# Patient Record
Sex: MALE | Race: WHITE | Employment: OTHER | URBAN - METROPOLITAN AREA
[De-identification: names, ages, dates, MRNs, and addresses within clinical notes are randomized per-mention and may not be internally consistent; named-entity substitution may affect disease eponyms.]

---

## 2017-03-22 RX ORDER — METHYLPREDNISOLONE 4 MG/1
TABLET ORAL
Qty: 1 KIT | Refills: 0 | Status: SHIPPED | OUTPATIENT
Start: 2017-03-22 | End: 2017-03-28

## 2017-04-10 ENCOUNTER — OFFICE VISIT (OUTPATIENT)
Dept: FAMILY MEDICINE CLINIC | Age: 27
End: 2017-04-10

## 2017-04-10 VITALS
BODY MASS INDEX: 24.04 KG/M2 | TEMPERATURE: 98.1 F | HEIGHT: 68 IN | SYSTOLIC BLOOD PRESSURE: 132 MMHG | HEART RATE: 76 BPM | WEIGHT: 158.6 LBS | DIASTOLIC BLOOD PRESSURE: 84 MMHG | RESPIRATION RATE: 16 BRPM

## 2017-04-10 DIAGNOSIS — F51.01 PRIMARY INSOMNIA: Primary | ICD-10-CM

## 2017-04-10 DIAGNOSIS — L30.9 ECZEMA, UNSPECIFIED TYPE: ICD-10-CM

## 2017-04-10 PROCEDURE — 99213 OFFICE O/P EST LOW 20 MIN: CPT | Performed by: FAMILY MEDICINE

## 2017-04-10 RX ORDER — PREDNISONE 10 MG/1
TABLET ORAL
Qty: 30 TABLET | Refills: 0 | Status: SHIPPED | OUTPATIENT
Start: 2017-04-10 | End: 2017-10-30 | Stop reason: SDUPTHER

## 2017-04-10 RX ORDER — ZOLPIDEM TARTRATE 10 MG/1
TABLET ORAL
Qty: 30 TABLET | Refills: 5 | Status: SHIPPED | OUTPATIENT
Start: 2017-04-10 | End: 2017-10-23 | Stop reason: SDUPTHER

## 2017-04-10 ASSESSMENT — ENCOUNTER SYMPTOMS
COUGH: 0
SHORTNESS OF BREATH: 0
DIARRHEA: 0
CONSTIPATION: 0
EYES NEGATIVE: 1
ABDOMINAL PAIN: 0
NAUSEA: 0

## 2017-09-12 DIAGNOSIS — L30.9 ECZEMA, UNSPECIFIED TYPE: ICD-10-CM

## 2017-10-23 DIAGNOSIS — F51.01 PRIMARY INSOMNIA: ICD-10-CM

## 2017-10-23 RX ORDER — ZOLPIDEM TARTRATE 10 MG/1
TABLET ORAL
Qty: 30 TABLET | Refills: 0 | OUTPATIENT
Start: 2017-10-23 | End: 2017-10-30 | Stop reason: SDUPTHER

## 2017-10-23 NOTE — TELEPHONE ENCOUNTER
Last seen 4-2017. Dr. Chandu Ambriz patient. Has an upcomming appointment, but will be out of medication.

## 2017-10-23 NOTE — TELEPHONE ENCOUNTER
From: Fabian Recio  Sent: 10/23/2017 10:49 AM EDT  Subject: Medication Renewal Request    Fabian Recio would like a refill of the following medications:  zolpidem (AMBIEN) 10 MG tablet Elke Araiza MD]    Preferred pharmacy: [facility] [address] [place] [address] [phone] F [phone]    Comment:  Out of prescription on 10/25, seeing doctor for appt on 10/30. Hoping to get this filled before the doctor visit.

## 2017-10-30 ENCOUNTER — OFFICE VISIT (OUTPATIENT)
Dept: FAMILY MEDICINE CLINIC | Age: 27
End: 2017-10-30

## 2017-10-30 VITALS
SYSTOLIC BLOOD PRESSURE: 112 MMHG | BODY MASS INDEX: 25.04 KG/M2 | WEIGHT: 165.2 LBS | TEMPERATURE: 97.3 F | RESPIRATION RATE: 12 BRPM | HEART RATE: 52 BPM | DIASTOLIC BLOOD PRESSURE: 78 MMHG | HEIGHT: 68 IN

## 2017-10-30 DIAGNOSIS — F51.01 PRIMARY INSOMNIA: ICD-10-CM

## 2017-10-30 DIAGNOSIS — L30.8 OTHER ECZEMA: Primary | ICD-10-CM

## 2017-10-30 DIAGNOSIS — L30.9 ECZEMA, UNSPECIFIED TYPE: ICD-10-CM

## 2017-10-30 PROCEDURE — 99213 OFFICE O/P EST LOW 20 MIN: CPT | Performed by: FAMILY MEDICINE

## 2017-10-30 PROCEDURE — G8484 FLU IMMUNIZE NO ADMIN: HCPCS | Performed by: FAMILY MEDICINE

## 2017-10-30 PROCEDURE — G8427 DOCREV CUR MEDS BY ELIG CLIN: HCPCS | Performed by: FAMILY MEDICINE

## 2017-10-30 PROCEDURE — 1036F TOBACCO NON-USER: CPT | Performed by: FAMILY MEDICINE

## 2017-10-30 PROCEDURE — 96372 THER/PROPH/DIAG INJ SC/IM: CPT | Performed by: FAMILY MEDICINE

## 2017-10-30 PROCEDURE — G8419 CALC BMI OUT NRM PARAM NOF/U: HCPCS | Performed by: FAMILY MEDICINE

## 2017-10-30 RX ORDER — PREDNISONE 10 MG/1
TABLET ORAL
Qty: 30 TABLET | Refills: 0 | Status: SHIPPED | OUTPATIENT
Start: 2017-10-30 | End: 2017-11-09

## 2017-10-30 RX ORDER — ZOLPIDEM TARTRATE 10 MG/1
TABLET ORAL
Qty: 30 TABLET | Refills: 5 | Status: SHIPPED | OUTPATIENT
Start: 2017-10-30 | End: 2018-05-14 | Stop reason: SDUPTHER

## 2017-10-30 RX ORDER — TRIAMCINOLONE ACETONIDE 40 MG/ML
80 INJECTION, SUSPENSION INTRA-ARTICULAR; INTRAMUSCULAR ONCE
Status: COMPLETED | OUTPATIENT
Start: 2017-10-30 | End: 2017-10-30

## 2017-10-30 RX ADMIN — TRIAMCINOLONE ACETONIDE 80 MG: 40 INJECTION, SUSPENSION INTRA-ARTICULAR; INTRAMUSCULAR at 19:07

## 2017-10-30 ASSESSMENT — ENCOUNTER SYMPTOMS
SHORTNESS OF BREATH: 0
EYES NEGATIVE: 1
NAUSEA: 0
ABDOMINAL PAIN: 0
COUGH: 0
CONSTIPATION: 0
DIARRHEA: 0

## 2017-10-30 NOTE — PROGRESS NOTES
Not on file     Other Topics Concern    Not on file     Social History Narrative    No narrative on file     No family history on file. Allergies   Allergen Reactions    Amoxil [Amoxicillin Trihydrate] Hives    Ketek [Telithromycin] Hives    Pcn [Penicillins] Hives     Current Outpatient Prescriptions   Medication Sig Dispense Refill    zolpidem (AMBIEN) 10 MG tablet TAKE ONE TABLET BY MOUTH IN THE EVENING AS NEEDED 30 tablet 5    fluocinonide (LIDEX) 0.05 % cream Apply topically 2 times daily. 30 g 1    predniSONE (DELTASONE) 10 MG tablet TID FOR 5 DAYS THEN BID FOR 5 DAYS THEN 1 DAILY 30 tablet 0    hydrocortisone 2.5 % cream Apply topically 2 times daily. 360 g 2     No current facility-administered medications for this visit. PMH, Surgical Hx, Family Hx, and Social Hx reviewed and updated. Health Maintenance reviewed. Objective    Vitals:    10/30/17 1816   BP: 112/78   Pulse: 52   Resp: 12   Temp: 97.3 °F (36.3 °C)   TempSrc: Temporal   Weight: 165 lb 3.2 oz (74.9 kg)   Height: 5' 8\" (1.727 m)       Physical Exam   Constitutional: He appears well-nourished. HENT:   Nose: Nose normal.   Mouth/Throat: Oropharynx is clear and moist.   Eyes: Conjunctivae are normal. Pupils are equal, round, and reactive to light. Neck: Neck supple. No thyromegaly present. Cardiovascular: Normal rate, regular rhythm and normal heart sounds. No murmur heard. Pulmonary/Chest: Breath sounds normal. He has no wheezes. Abdominal: Soft. He exhibits no mass. There is no tenderness. Lymphadenopathy:     He has no cervical adenopathy. Skin: Rash noted. MANY DRY ECZEMA PATCHES MOSTLY TRUNK       Assessment & Plan   1. Other eczema  fluocinonide (LIDEX) 0.05 % cream    triamcinolone acetonide (KENALOG-40) injection 80 mg   2. Primary insomnia  zolpidem (AMBIEN) 10 MG tablet   3.  Eczema, unspecified type  predniSONE (DELTASONE) 10 MG tablet     No orders of the defined types were placed in this

## 2018-04-11 ENCOUNTER — TELEPHONE (OUTPATIENT)
Dept: FAMILY MEDICINE CLINIC | Age: 28
End: 2018-04-11

## 2018-04-11 DIAGNOSIS — R94.31 PROLONGED QT INTERVAL: Primary | ICD-10-CM

## 2018-05-14 DIAGNOSIS — F51.01 PRIMARY INSOMNIA: ICD-10-CM

## 2018-05-14 RX ORDER — ZOLPIDEM TARTRATE 10 MG/1
TABLET ORAL
Qty: 30 TABLET | Refills: 2 | OUTPATIENT
Start: 2018-05-14 | End: 2018-05-19 | Stop reason: SDUPTHER

## 2018-05-19 ENCOUNTER — OFFICE VISIT (OUTPATIENT)
Dept: FAMILY MEDICINE CLINIC | Age: 28
End: 2018-05-19

## 2018-05-19 VITALS
SYSTOLIC BLOOD PRESSURE: 116 MMHG | HEART RATE: 80 BPM | DIASTOLIC BLOOD PRESSURE: 70 MMHG | WEIGHT: 152 LBS | RESPIRATION RATE: 20 BRPM | HEIGHT: 68 IN | BODY MASS INDEX: 23.04 KG/M2 | TEMPERATURE: 97.6 F

## 2018-05-19 DIAGNOSIS — F51.01 PRIMARY INSOMNIA: ICD-10-CM

## 2018-05-19 PROCEDURE — 99212 OFFICE O/P EST SF 10 MIN: CPT | Performed by: FAMILY MEDICINE

## 2018-05-19 RX ORDER — ZOLPIDEM TARTRATE 10 MG/1
TABLET ORAL
Qty: 30 TABLET | Refills: 5 | Status: SHIPPED | OUTPATIENT
Start: 2018-05-19 | End: 2018-07-19

## 2018-05-19 ASSESSMENT — ENCOUNTER SYMPTOMS
SINUS PRESSURE: 0
ABDOMINAL PAIN: 0
CHEST TIGHTNESS: 0
EYE ITCHING: 0
EYE DISCHARGE: 0
CONSTIPATION: 0
SHORTNESS OF BREATH: 0
SORE THROAT: 0
DIARRHEA: 0

## 2018-07-30 ENCOUNTER — OFFICE VISIT (OUTPATIENT)
Dept: FAMILY MEDICINE CLINIC | Age: 28
End: 2018-07-30

## 2018-07-30 VITALS
WEIGHT: 147.2 LBS | SYSTOLIC BLOOD PRESSURE: 106 MMHG | DIASTOLIC BLOOD PRESSURE: 80 MMHG | TEMPERATURE: 96.4 F | HEART RATE: 66 BPM | RESPIRATION RATE: 12 BRPM | HEIGHT: 68 IN | BODY MASS INDEX: 22.31 KG/M2

## 2018-07-30 DIAGNOSIS — F51.01 PRIMARY INSOMNIA: Primary | ICD-10-CM

## 2018-07-30 PROCEDURE — 99212 OFFICE O/P EST SF 10 MIN: CPT | Performed by: FAMILY MEDICINE

## 2018-07-30 RX ORDER — ZOLPIDEM TARTRATE 5 MG/1
5 TABLET ORAL NIGHTLY PRN
Qty: 30 TABLET | Refills: 2 | Status: SHIPPED | OUTPATIENT
Start: 2018-07-30 | End: 2018-08-29

## 2018-07-30 ASSESSMENT — ENCOUNTER SYMPTOMS
NAUSEA: 0
DIARRHEA: 0
EYES NEGATIVE: 1
SHORTNESS OF BREATH: 0
CONSTIPATION: 0
ABDOMINAL PAIN: 0
COUGH: 0

## 2018-07-30 ASSESSMENT — PATIENT HEALTH QUESTIONNAIRE - PHQ9
SUM OF ALL RESPONSES TO PHQ QUESTIONS 1-9: 2
2. FEELING DOWN, DEPRESSED OR HOPELESS: 1
1. LITTLE INTEREST OR PLEASURE IN DOING THINGS: 1
SUM OF ALL RESPONSES TO PHQ9 QUESTIONS 1 & 2: 2

## 2018-07-30 NOTE — PROGRESS NOTES
medications. No Follow-up on file. Long talk. Health maintenance issues reviewed and discussed with recommendations made. The importance of a good diet and exercise regimen discussed. Take medications as prescribed. Cut back on Ambien to 5 mg and then continue to wean off  Sleep habits reviewed  Consider trial of belsomra in future if does not respond  Follow up as instructed. Call if any problems           Stew De La Torre MD          Please note this report has been partially produced using speech recognition software  And may cause contain errors related to that system including grammar, punctuation and spelling as well as words and phrases that may seem inappropriate. If there are questions or concerns please feel free to contact me to clarify.

## 2019-01-21 ENCOUNTER — OFFICE VISIT (OUTPATIENT)
Dept: FAMILY MEDICINE CLINIC | Age: 29
End: 2019-01-21

## 2019-01-21 VITALS
TEMPERATURE: 97.9 F | SYSTOLIC BLOOD PRESSURE: 110 MMHG | BODY MASS INDEX: 22.38 KG/M2 | HEART RATE: 64 BPM | DIASTOLIC BLOOD PRESSURE: 60 MMHG | WEIGHT: 147.2 LBS

## 2019-01-21 DIAGNOSIS — F90.0 ATTENTION DEFICIT HYPERACTIVITY DISORDER (ADHD), PREDOMINANTLY INATTENTIVE TYPE: ICD-10-CM

## 2019-01-21 DIAGNOSIS — F51.01 PRIMARY INSOMNIA: Primary | ICD-10-CM

## 2019-01-21 PROCEDURE — 99212 OFFICE O/P EST SF 10 MIN: CPT | Performed by: FAMILY MEDICINE

## 2019-01-21 RX ORDER — ZOLPIDEM TARTRATE 5 MG/1
5 TABLET ORAL NIGHTLY PRN
Qty: 30 TABLET | Refills: 4 | Status: SHIPPED | OUTPATIENT
Start: 2019-01-21 | End: 2019-02-04

## 2019-01-21 RX ORDER — PHENOL 1.4 %
1 AEROSOL, SPRAY (ML) MUCOUS MEMBRANE NIGHTLY
COMMUNITY

## 2019-01-21 RX ORDER — DEXTROAMPHETAMINE SACCHARATE, AMPHETAMINE ASPARTATE, DEXTROAMPHETAMINE SULFATE AND AMPHETAMINE SULFATE 3.75; 3.75; 3.75; 3.75 MG/1; MG/1; MG/1; MG/1
15 TABLET ORAL 2 TIMES DAILY
Qty: 60 TABLET | Refills: 0 | Status: SHIPPED | OUTPATIENT
Start: 2019-01-21 | End: 2019-02-20

## 2019-01-21 ASSESSMENT — ENCOUNTER SYMPTOMS
EYES NEGATIVE: 1
COUGH: 0
CONSTIPATION: 0
ABDOMINAL PAIN: 0
NAUSEA: 0
DIARRHEA: 0
SHORTNESS OF BREATH: 0

## 2019-03-22 ENCOUNTER — TELEPHONE (OUTPATIENT)
Dept: FAMILY MEDICINE CLINIC | Age: 29
End: 2019-03-22

## 2023-02-28 PROBLEM — I45.81 PROLONGED QT SYNDROME: Status: ACTIVE | Noted: 2023-02-28

## 2023-02-28 PROBLEM — F51.01 PRIMARY INSOMNIA: Status: ACTIVE | Noted: 2023-02-28

## 2023-02-28 PROBLEM — F98.8 ATTENTION DEFICIT DISORDER: Status: ACTIVE | Noted: 2023-02-28

## 2023-02-28 PROBLEM — L85.3 DRY SKIN: Status: ACTIVE | Noted: 2023-02-28

## 2023-02-28 PROBLEM — R53.83 MALAISE AND FATIGUE: Status: ACTIVE | Noted: 2023-02-28

## 2023-02-28 PROBLEM — R53.81 MALAISE AND FATIGUE: Status: ACTIVE | Noted: 2023-02-28

## 2023-02-28 RX ORDER — DEXTROAMPHETAMINE SACCHARATE, AMPHETAMINE ASPARTATE, DEXTROAMPHETAMINE SULFATE AND AMPHETAMINE SULFATE 2.5; 2.5; 2.5; 2.5 MG/1; MG/1; MG/1; MG/1
1 TABLET ORAL 3 TIMES DAILY
COMMUNITY
Start: 2019-01-21 | End: 2023-03-13 | Stop reason: SDUPTHER

## 2023-02-28 RX ORDER — TRIAMCINOLONE ACETONIDE 1 MG/G
CREAM TOPICAL SEE ADMIN INSTRUCTIONS
COMMUNITY

## 2023-02-28 RX ORDER — ZOLPIDEM TARTRATE 5 MG/1
1 TABLET ORAL NIGHTLY PRN
COMMUNITY
Start: 2019-04-15 | End: 2023-03-13 | Stop reason: SDUPTHER

## 2023-03-13 ENCOUNTER — OFFICE VISIT (OUTPATIENT)
Dept: PRIMARY CARE | Facility: CLINIC | Age: 33
End: 2023-03-13
Payer: COMMERCIAL

## 2023-03-13 VITALS
TEMPERATURE: 98.6 F | HEART RATE: 68 BPM | DIASTOLIC BLOOD PRESSURE: 76 MMHG | SYSTOLIC BLOOD PRESSURE: 110 MMHG | BODY MASS INDEX: 22.69 KG/M2 | WEIGHT: 153.2 LBS | RESPIRATION RATE: 16 BRPM | HEIGHT: 69 IN

## 2023-03-13 DIAGNOSIS — I45.81 PROLONGED QT SYNDROME: ICD-10-CM

## 2023-03-13 DIAGNOSIS — Z00.00 ANNUAL PHYSICAL EXAM: Primary | ICD-10-CM

## 2023-03-13 DIAGNOSIS — F51.01 PRIMARY INSOMNIA: ICD-10-CM

## 2023-03-13 DIAGNOSIS — F98.8 ATTENTION DEFICIT DISORDER, UNSPECIFIED HYPERACTIVITY PRESENCE: ICD-10-CM

## 2023-03-13 PROCEDURE — 1036F TOBACCO NON-USER: CPT | Performed by: FAMILY MEDICINE

## 2023-03-13 PROCEDURE — 99395 PREV VISIT EST AGE 18-39: CPT | Performed by: FAMILY MEDICINE

## 2023-03-13 RX ORDER — ZOLPIDEM TARTRATE 5 MG/1
5 TABLET ORAL NIGHTLY PRN
Qty: 30 TABLET | Refills: 5 | Status: SHIPPED | OUTPATIENT
Start: 2023-03-13 | End: 2023-03-14 | Stop reason: SDUPTHER

## 2023-03-13 RX ORDER — DEXTROAMPHETAMINE SACCHARATE, AMPHETAMINE ASPARTATE, DEXTROAMPHETAMINE SULFATE AND AMPHETAMINE SULFATE 2.5; 2.5; 2.5; 2.5 MG/1; MG/1; MG/1; MG/1
1 TABLET ORAL 3 TIMES DAILY
Qty: 90 TABLET | Refills: 0 | Status: SHIPPED | OUTPATIENT
Start: 2023-03-13 | End: 2023-03-14 | Stop reason: SDUPTHER

## 2023-03-13 ASSESSMENT — PATIENT HEALTH QUESTIONNAIRE - PHQ9
1. LITTLE INTEREST OR PLEASURE IN DOING THINGS: NOT AT ALL
2. FEELING DOWN, DEPRESSED OR HOPELESS: NOT AT ALL
SUM OF ALL RESPONSES TO PHQ9 QUESTIONS 1 AND 2: 0

## 2023-03-13 NOTE — PROGRESS NOTES
"Subjective   Patient ID: Hao Dunbar is a 32 y.o. male who presents for ADD and Insomnia.  HPI  Patient presents today for a follow-up on ADD. Is taking Adderall 10 MG. States he has no concerns with this medication. Rates the ADD a 0/10 over the past 7 days. Reports that the medication gives 100% ADD control/relief. OARRS reviewed today. Controlled substance contract signed on 1-3-23. UDS 1-3-23.    Also presents today for a follow-up on Insomnia. Is taking Ambien 5 MG. No issues with the medication. Rates the insomnia a 0/10 over the past 7 days. Reports that the medication gives 100% insomnia control/relief. OARRS reviewed today. Controlled substance contract signed on 1-3-23. UDS 1-3-23.    Taking current medications which were reviewed.  Problem list discussed.    Overall doing well.  Eating okay.  Staying active.  Getting  in July    Has no other new problem /question.    Review of Systems  Constitutional- No activity change. No appetite change.  Eyes- Denies vision changes.  Respiratory- No shortness of breath.  Cardiovascular- No palpitations. No chest pain.  GI- No nausea or vomiting. No diarrhea or constipation. Denies abdominal pain.  Musculoskeletal- Denies joint swelling.  Extremities- No edema.  Neurological- Denies headaches. Denies dizziness.  Skin- No rashes.  Psychiatric/Behavioral- Denies significant anxiety, or depressed mood.  Objective     /76   Pulse 68   Temp 37 °C (98.6 °F)   Resp 16   Ht 1.753 m (5' 9\")   Wt 69.5 kg (153 lb 3.2 oz)   BMI 22.62 kg/m²     Allergies   Allergen Reactions    Amoxicillin Rash    Clindamycin Rash    Penicillins Rash       Constitutional-- Well-nourished.  No distress  Head- unremarkable.  Ears- TMs clear.  Eyes- PERRL.  Conjunctiva normal.  Nose- Normal.  No rhinorrhea noted.  Throat- Oropharynx is clear and moist.  Neck- Supple with no thyromegaly.  No significant cervical adenopathy noted.  Pulmonary/Chest- Breath sounds normal with normal " effort.  No wheezing.  Heart- Regular rate and rhythm.  No murmur.  Abdomen- Soft and non-tender.  No masses noted.  Musculoskeletal- Normal ROM.  No significant joint swelling  Extremities- No edema.   Neurological- Alert.  No noted deficits.  Skin- Warm.  No rashes.  Psychiatric/Behavioral- Mood and affect normal.  Behavior normal.    Assessment/Plan   Problem List Items Addressed This Visit          Nervous    Primary insomnia under good control with medication    Relevant Medications    zolpidem (Ambien) 5 mg tablet       Circulatory    Prolonged QT syndrome stable.       Other    Attention deficit disorder -     Relevant Medications    amphetamine-dextroamphetamine (Adderall) 10 mg tablet     Other Visit Diagnoses       Annual physical exam    primary    Relevant Orders    CBC and Auto Differential    Comprehensive metabolic panel    Lipid panel                Long talk. Treatment options reviewed.      Continue and take your medications as prescribed.  Understands to use least amount of medication to control his symptoms  Health Maintenance issues discussed.    Importance of healthy diet and regular exercise regimen discussed.    We will contact you with any test results ordered. If you do not hear from us, please contact.    Follow-up as instructed or sooner if any problems or symptoms do not resolve as expected.

## 2023-03-14 DIAGNOSIS — F51.01 PRIMARY INSOMNIA: ICD-10-CM

## 2023-03-14 DIAGNOSIS — F98.8 ATTENTION DEFICIT DISORDER, UNSPECIFIED HYPERACTIVITY PRESENCE: ICD-10-CM

## 2023-03-14 RX ORDER — DEXTROAMPHETAMINE SACCHARATE, AMPHETAMINE ASPARTATE, DEXTROAMPHETAMINE SULFATE AND AMPHETAMINE SULFATE 2.5; 2.5; 2.5; 2.5 MG/1; MG/1; MG/1; MG/1
1 TABLET ORAL 3 TIMES DAILY
Qty: 90 TABLET | Refills: 0 | Status: SHIPPED | OUTPATIENT
Start: 2023-03-14 | End: 2023-05-31 | Stop reason: SDUPTHER

## 2023-03-14 RX ORDER — ZOLPIDEM TARTRATE 5 MG/1
5 TABLET ORAL NIGHTLY PRN
Qty: 30 TABLET | Refills: 5 | Status: SHIPPED | OUTPATIENT
Start: 2023-03-14 | End: 2023-09-18 | Stop reason: SDUPTHER

## 2023-03-14 NOTE — TELEPHONE ENCOUNTER
PATIENT CALLED AND STATED THAT HIS ADDERALL AND ZOLPIDEM WENT TO [facility].  [employer] DOESN'T TAKE PATIENT'S INSURANCE AND WOULD LIKE FOR IT TO GET RESENT TO [facility].      PLEASE ADVISE

## 2023-05-31 DIAGNOSIS — F98.8 ATTENTION DEFICIT DISORDER, UNSPECIFIED HYPERACTIVITY PRESENCE: ICD-10-CM

## 2023-05-31 RX ORDER — DEXTROAMPHETAMINE SACCHARATE, AMPHETAMINE ASPARTATE, DEXTROAMPHETAMINE SULFATE AND AMPHETAMINE SULFATE 2.5; 2.5; 2.5; 2.5 MG/1; MG/1; MG/1; MG/1
1 TABLET ORAL 3 TIMES DAILY
Qty: 90 TABLET | Refills: 0 | Status: SHIPPED | OUTPATIENT
Start: 2023-05-31 | End: 2023-07-18 | Stop reason: SDUPTHER

## 2023-05-31 NOTE — TELEPHONE ENCOUNTER
Rx Refill Request Telephone Encounter    Name: Hao Dunbar  :  1990    Medication Name:   ADDERALL  Dose (Optional):   10 MG  Quantity (Optional):   #90  Directions (Optional):   1 TABLET THREE TIMES A DAY    ALLERGIES:    ON FILE    LAST DRUG SCREEN:     1/3/23  LAST MED CONTRACT:    1/3/23    Specific Pharmacy location:    [facility]    Date of last appointment:    3/13/23  Date of next appointment:    NONE    Best number to reach patient:    [phone]

## 2023-07-17 DIAGNOSIS — F98.8 ATTENTION DEFICIT DISORDER, UNSPECIFIED HYPERACTIVITY PRESENCE: ICD-10-CM

## 2023-07-17 NOTE — TELEPHONE ENCOUNTER
Rx Refill Request Telephone Encounter    Name: Hao Dunbar  :  1990    Medication Name:   Addreall 10 MG  Quantity (Optional):   90  Directions (Optional):   Take 1 tablet (10 mg) by mouth 3 times a day.     LAST DRUG SCREEN:       LAST MED CONTRACT:        Specific Pharmacy location:    [facility]    Date of last appointment:    3/13/23

## 2023-07-18 RX ORDER — DEXTROAMPHETAMINE SACCHARATE, AMPHETAMINE ASPARTATE, DEXTROAMPHETAMINE SULFATE AND AMPHETAMINE SULFATE 2.5; 2.5; 2.5; 2.5 MG/1; MG/1; MG/1; MG/1
1 TABLET ORAL 3 TIMES DAILY
Qty: 90 TABLET | Refills: 0 | Status: SHIPPED | OUTPATIENT
Start: 2023-07-18 | End: 2023-09-18 | Stop reason: SDUPTHER

## 2023-08-29 ENCOUNTER — TELEPHONE (OUTPATIENT)
Dept: PRIMARY CARE | Facility: CLINIC | Age: 33
End: 2023-08-29
Payer: COMMERCIAL

## 2023-08-29 NOTE — TELEPHONE ENCOUNTER
Lorenzo Shea MD  [id number] [id number] Clinical Support Staff3 minutes ago (3:21 PM)       Please let the patient know.  Hopefully he has enough to last him.       I called and LM for patient to call the office    details…

## 2023-08-29 NOTE — TELEPHONE ENCOUNTER
PATIENT CALLED TO ASK IF ITS OK TO RENEW HIS PRESCRIPTION FOR THE AMBIEN EARLY.  HE IS GOING ON HIS HONEYMOON THIS WEEK AND WILL NOT BE ABLE PICK IT UP TILL HE GETS BACK WHICH IS THE 9/17 BUT HE WILL RUN OUT 9/10   PLEASE ADVISE

## 2023-08-29 NOTE — TELEPHONE ENCOUNTER
"I called and spoke to the pharmacist at [facility]. He refused to refill the patients prescription of Ambien because it is \"way to early and I'm not losing my license.\" He also explained that if he is anywhere in the [place] he can get this at any [facility]. He also stated that if you are okaying an early fill it ca not be done over the phone it needs to be done by fax.    "

## 2023-08-29 NOTE — TELEPHONE ENCOUNTER
PATIENT IS AWARE OF MESSAGE  HE SAID HE IS GOING TO SPEAK TO PHARMACY AND ASK WHAT IS THE EARLIEST THAT THEY WOULD FILL IT LEGALLY.  HE WILL CALL US OR HE WILL CALL US WHEN IT CLOSER TO HIS FILL DATE ON HIS HONEYMOON

## 2023-09-16 DIAGNOSIS — F51.01 PRIMARY INSOMNIA: ICD-10-CM

## 2023-09-18 ENCOUNTER — OFFICE VISIT (OUTPATIENT)
Dept: PRIMARY CARE | Facility: CLINIC | Age: 33
End: 2023-09-18
Payer: COMMERCIAL

## 2023-09-18 VITALS
WEIGHT: 157.4 LBS | HEIGHT: 69 IN | SYSTOLIC BLOOD PRESSURE: 116 MMHG | OXYGEN SATURATION: 99 % | RESPIRATION RATE: 12 BRPM | BODY MASS INDEX: 23.31 KG/M2 | HEART RATE: 63 BPM | TEMPERATURE: 98.5 F | DIASTOLIC BLOOD PRESSURE: 70 MMHG

## 2023-09-18 DIAGNOSIS — F98.8 ATTENTION DEFICIT DISORDER, UNSPECIFIED HYPERACTIVITY PRESENCE: ICD-10-CM

## 2023-09-18 DIAGNOSIS — F51.01 PRIMARY INSOMNIA: Primary | ICD-10-CM

## 2023-09-18 PROCEDURE — 1036F TOBACCO NON-USER: CPT | Performed by: FAMILY MEDICINE

## 2023-09-18 PROCEDURE — 99213 OFFICE O/P EST LOW 20 MIN: CPT | Performed by: FAMILY MEDICINE

## 2023-09-18 RX ORDER — ZOLPIDEM TARTRATE 5 MG/1
5 TABLET ORAL NIGHTLY PRN
Qty: 30 TABLET | Refills: 5 | Status: SHIPPED | OUTPATIENT
Start: 2023-09-18 | End: 2024-03-19

## 2023-09-18 RX ORDER — ZOLPIDEM TARTRATE 5 MG/1
5 TABLET ORAL NIGHTLY PRN
Qty: 30 TABLET | OUTPATIENT
Start: 2023-09-18

## 2023-09-18 RX ORDER — DEXTROAMPHETAMINE SACCHARATE, AMPHETAMINE ASPARTATE, DEXTROAMPHETAMINE SULFATE AND AMPHETAMINE SULFATE 2.5; 2.5; 2.5; 2.5 MG/1; MG/1; MG/1; MG/1
1 TABLET ORAL 2 TIMES DAILY
Qty: 60 TABLET | Refills: 0 | Status: SHIPPED | OUTPATIENT
Start: 2023-09-18 | End: 2023-10-19 | Stop reason: SDUPTHER

## 2023-09-18 NOTE — PROGRESS NOTES
"Subjective   Patient ID: Hao Dunbar is a 33 y.o. male who presents for Insomnia and ADD.  HPI  Patient presents today for a follow-up on Insomnia. Is taking Ambien 5 MG. No issues with the medication. Rates the insomnia a 1/10 over the past 7 days. Reports that the medication gives 100% insomnia control/relief. OARRS reviewed today. Controlled substance contract signed 1-3-23. UDS 1-3-23.      Also  presents today for a follow-up on ADD. Is taking Adderall 10 MG. States he has no concerns with this medication. Rates the ADD a 1-2/10 over the past 7 days. Reports that the medication gives 100% ADD control/relief. OARRS reviewed today.  Controlled substance contract signed 1-3-23. UDS 1-3-23.        Taking current medications which were reviewed.  Problem list discussed.    Overall doing well.  Eating okay.  Staying active.    Has no other new problem /question.    ROS  Constitutional- No activity change. No appetite change.  Eyes- Denies vision changes.  Respiratory- No shortness of breath.  Cardiovascular- No palpitations. No chest pain.  GI- No nausea or vomiting. No diarrhea or constipation. Denies abdominal pain.  Musculoskeletal- Denies joint swelling.  Neurological- Denies headaches. Denies dizziness.  Skin- No rashes.  Psychiatric/Behavioral- Denies significant anxiety, or depressed mood.     Objective     /70   Pulse 63   Temp 36.9 °C (98.5 °F)   Resp 12   Ht 1.753 m (5' 9\")   Wt 71.4 kg (157 lb 6.4 oz)   SpO2 99%   BMI 23.24 kg/m²     Allergies   Allergen Reactions    Amoxicillin Rash    Clindamycin Rash    Penicillins Rash       Constitutional-- Well-nourished.  No distress  Eyes- PERRL.  Conjunctiva normal.  Nose- Normal.  No rhinorrhea noted.  Throat- Oropharynx is clear and moist.  Neck- Supple with no thyromegaly.  No significant cervical adenopathy noted.  Pulmonary/Chest- Breath sounds normal with normal effort.  No wheezing.  Heart- Regular rate and rhythm.  No murmur.  Abdomen- " Soft and non-tender.  No masses noted.  Musculoskeletal- Normal ROM.  No significant joint swelling  Extremities- No edema.   Neurological- Alert.  No noted deficits.  Skin- Warm.    Psychiatric/Behavioral- Mood and affect normal.  Behavior normal.     Assessment/Plan   1. Primary insomnia  zolpidem (Ambien) 5 mg tablet      2. Attention deficit disorder, unspecified hyperactivity presence  amphetamine-dextroamphetamine (Adderall) 10 mg tablet             Long talk. Treatment options reviewed.    Continue current medication. Educated on insomnia and sleep hygiene.    I have reviewed and validated OARRS. I advised the patient about medication abuse, addiction, and dependency. I have counseled the patient on ADHD management.     Continue and take your medications as prescribed.    Health Maintenance issues discussed.    Importance of healthy diet and regular exercise regimen discussed.    We will contact you with any test results ordered. If you do not hear from us, please contact.    Follow-up as instructed or sooner if any problems or symptoms do not resolve as expected.          Scribe Attestation  By signing my name below, ILatonia Scribe   attest that this documentation has been prepared under the direction and in the presence of Lorenzo Shea MD.

## 2023-10-19 DIAGNOSIS — F98.8 ATTENTION DEFICIT DISORDER, UNSPECIFIED HYPERACTIVITY PRESENCE: ICD-10-CM

## 2023-10-19 RX ORDER — DEXTROAMPHETAMINE SACCHARATE, AMPHETAMINE ASPARTATE, DEXTROAMPHETAMINE SULFATE AND AMPHETAMINE SULFATE 2.5; 2.5; 2.5; 2.5 MG/1; MG/1; MG/1; MG/1
1 TABLET ORAL 2 TIMES DAILY
Qty: 60 TABLET | Refills: 0 | Status: SHIPPED | OUTPATIENT
Start: 2023-10-19 | End: 2023-11-27 | Stop reason: SDUPTHER

## 2023-10-19 NOTE — TELEPHONE ENCOUNTER
Rx Controlled Refill Request Telephone Encounter    Name: Hao Dunbar  :  1990    Medication Name:   ADDERALL  Dose (Optional):   10 MG   Quantity (Optional):   60 TABLET   Directions (Optional):   TAKE 1 TABLET BY MOUTH 2 TIMES A DAY     ALLERGIES:    ON FILE     LAST DRUG SCREEN:     1/3/23  LAST MED CONTRACT:    1/3/23    Specific Pharmacy location:    [facility]     Date of last appointment:    23  Date of next appointment:    NOT SCHEDULED    Best number to reach patient:    [phone]

## 2023-11-27 DIAGNOSIS — F98.8 ATTENTION DEFICIT DISORDER, UNSPECIFIED HYPERACTIVITY PRESENCE: ICD-10-CM

## 2023-11-27 RX ORDER — DEXTROAMPHETAMINE SACCHARATE, AMPHETAMINE ASPARTATE, DEXTROAMPHETAMINE SULFATE AND AMPHETAMINE SULFATE 2.5; 2.5; 2.5; 2.5 MG/1; MG/1; MG/1; MG/1
1 TABLET ORAL 2 TIMES DAILY
Qty: 60 TABLET | Refills: 0 | Status: SHIPPED | OUTPATIENT
Start: 2023-11-27 | End: 2024-01-30 | Stop reason: SDUPTHER

## 2023-11-27 NOTE — TELEPHONE ENCOUNTER
Rx controlled Refill Request Telephone Encounter    Name:  Hao Dunbar  :  [id number]  Medication Name:  Adderall 10 mg  Dose : oral  Route : Oral  Frequency : 2 times daily  Quantity : 60 tablet  Directions : take 1 tablet by mouth 2 times a day  Specific Pharmacy location:   [facility]  Date of last appointment:  2023  Date of next appointment:  none  Best number to reach patient:  [phone]

## 2024-01-17 ENCOUNTER — TELEPHONE (OUTPATIENT)
Dept: PRIMARY CARE | Facility: CLINIC | Age: 34
End: 2024-01-17
Payer: COMMERCIAL

## 2024-01-17 NOTE — TELEPHONE ENCOUNTER
Rx Controlled Refill Request Telephone Encounter    Name: Hao Dunbar  :  1990    Medication Name:   ADDERALL   Dose (Optional):   10 MG  Quantity (Optional):   60  Directions (Optional):   1 TABLET TWICE A DAY    ALLERGIES:    ON FILE    LAST DRUG SCREEN:     1/3/23  LAST MED CONTRACT:    1/3/23    Specific Pharmacy location:    [facility] FILE    Date of last appointment:    23  Date of next appointment:        Best number to reach patient:    [phone]    CALLED PATIENT  - LMOM TO RTC NEEDS UDS & CSA BEFORE MEDICATION CAN BE PRESCRIBED.

## 2024-01-29 NOTE — PROGRESS NOTES
"Subjective   Patient ID: Hao Dunbar is a 33 y.o. male who presents for Insomnia and ADD.  HPI    Patient presents today for a follow-up on ADD.  Currently taking Adderall  No negative side effects reported from this medication  Rates ADD control a 10/10.  Last took it yesterday morning  Controlled substance contract signed today    Patient in office being seen for a follow up on Insomnia. Currently taking ambien. Last took last night. Medication agreement signed today. Reports that the medication is working 10/10. 100% managed with the medication. States there are no adverse effects.     Taking current medications which were reviewed.  Problem list discussed.    Overall doing well.  Eating okay.  Staying active.    Has no other new problem /question.     ROS  Constitutional- No activity change. No appetite change.  Eyes- Denies vision changes.  Respiratory- No shortness of breath.  Cardiovascular- No palpitations. No chest pain.  GI- No nausea or vomiting. No diarrhea or constipation. Denies abdominal pain.  Musculoskeletal- Denies joint swelling.  Extremities- No edema.  Neurological- Denies headaches. Denies dizziness.  Skin- No rashes.  Psychiatric/Behavioral- Denies significant anxiety, or depressed mood.     Objective     /72   Pulse 63   Resp 18   Ht 1.753 m (5' 9\")   Wt 74.4 kg (164 lb)   SpO2 99%   BMI 24.22 kg/m²     Allergies   Allergen Reactions    Amoxicillin Rash    Clindamycin Rash    Penicillins Rash       Constitutional-- Well-nourished.  No distress  Head- unremarkable.  Eyes- PERRL.  Conjunctiva normal.  Nose- Normal.  No rhinorrhea noted.  Throat- Oropharynx is clear and moist.  Neck- Supple with no thyromegaly.  No significant cervical adenopathy noted.  Pulmonary/Chest- Breath sounds normal with normal effort.  No wheezing.  Heart- Regular rate and rhythm.  No murmur.  Abdomen- Soft and non-tender.  No masses noted.  Musculoskeletal- Normal ROM.  No significant joint " swelling  Neurological- Alert.  No noted deficits.  Skin- Warm.   Psychiatric/Behavioral- Mood and affect normal.    Assessment/Plan   1. Primary insomnia        2. Attention deficit disorder, unspecified hyperactivity presence  amphetamine-dextroamphetamine (Adderall) 10 mg tablet      3. Therapeutic drug monitoring  Amphetamine Confirm, Urine    Opiate/Opioid/Benzo Prescription Compliance    Amphetamine Confirm, Urine    OOB Internal Tracking             Long talk. Treatment options reviewed.    Educated on insomnia and sleep hygiene.  Understands to use least amount of medication to control symptoms    I have reviewed and validated OARRS. I advised the patient about medication abuse, addiction, and dependency. I have counseled the patient on ADHD management.     Advised patient to remain up tod ate on routine maintenance and health screening.     Continue and take your medications as prescribed.    Health Maintenance issues discussed.    Importance of healthy diet and regular exercise regimen discussed.      Follow-up as instructed or sooner if any problems or symptoms do not resolve as expected.      Scribe Attestation  By signing my name below, ILatonia Scribe   attest that this documentation has been prepared under the direction and in the presence of Lorenzo Shea MD.

## 2024-01-30 ENCOUNTER — OFFICE VISIT (OUTPATIENT)
Dept: PRIMARY CARE | Facility: CLINIC | Age: 34
End: 2024-01-30
Payer: COMMERCIAL

## 2024-01-30 VITALS
DIASTOLIC BLOOD PRESSURE: 72 MMHG | BODY MASS INDEX: 24.29 KG/M2 | SYSTOLIC BLOOD PRESSURE: 110 MMHG | HEIGHT: 69 IN | OXYGEN SATURATION: 99 % | HEART RATE: 63 BPM | WEIGHT: 164 LBS | RESPIRATION RATE: 18 BRPM

## 2024-01-30 DIAGNOSIS — F98.8 ATTENTION DEFICIT DISORDER, UNSPECIFIED HYPERACTIVITY PRESENCE: ICD-10-CM

## 2024-01-30 DIAGNOSIS — Z51.81 THERAPEUTIC DRUG MONITORING: ICD-10-CM

## 2024-01-30 DIAGNOSIS — F51.01 PRIMARY INSOMNIA: ICD-10-CM

## 2024-01-30 PROCEDURE — 82570 ASSAY OF URINE CREATININE: CPT

## 2024-01-30 PROCEDURE — 80361 OPIATES 1 OR MORE: CPT

## 2024-01-30 PROCEDURE — 80365 DRUG SCREENING OXYCODONE: CPT

## 2024-01-30 PROCEDURE — 80324 DRUG SCREEN AMPHETAMINES 1/2: CPT

## 2024-01-30 PROCEDURE — 80358 DRUG SCREENING METHADONE: CPT

## 2024-01-30 PROCEDURE — 80373 DRUG SCREENING TRAMADOL: CPT

## 2024-01-30 PROCEDURE — 99213 OFFICE O/P EST LOW 20 MIN: CPT | Performed by: FAMILY MEDICINE

## 2024-01-30 PROCEDURE — 80354 DRUG SCREENING FENTANYL: CPT

## 2024-01-30 PROCEDURE — 80346 BENZODIAZEPINES1-12: CPT

## 2024-01-30 PROCEDURE — 1036F TOBACCO NON-USER: CPT | Performed by: FAMILY MEDICINE

## 2024-01-30 PROCEDURE — 80307 DRUG TEST PRSMV CHEM ANLYZR: CPT

## 2024-01-30 PROCEDURE — 80368 SEDATIVE HYPNOTICS: CPT

## 2024-01-30 RX ORDER — DEXTROAMPHETAMINE SACCHARATE, AMPHETAMINE ASPARTATE, DEXTROAMPHETAMINE SULFATE AND AMPHETAMINE SULFATE 2.5; 2.5; 2.5; 2.5 MG/1; MG/1; MG/1; MG/1
1 TABLET ORAL 2 TIMES DAILY
Qty: 60 TABLET | Refills: 0 | Status: SHIPPED | OUTPATIENT
Start: 2024-01-30 | End: 2024-03-11 | Stop reason: SDUPTHER

## 2024-01-31 LAB
AMPHETAMINES UR QL SCN: ABNORMAL
BARBITURATES UR QL SCN: ABNORMAL
BZE UR QL SCN: ABNORMAL
CANNABINOIDS UR QL SCN: ABNORMAL
CREAT UR-MCNC: 91.7 MG/DL (ref 20–370)
PCP UR QL SCN: ABNORMAL

## 2024-02-02 LAB
1OH-MIDAZOLAM UR CFM-MCNC: <25 NG/ML
6MAM UR CFM-MCNC: <25 NG/ML
7AMINOCLONAZEPAM UR CFM-MCNC: <25 NG/ML
A-OH ALPRAZ UR CFM-MCNC: <25 NG/ML
ALPRAZ UR CFM-MCNC: <25 NG/ML
CHLORDIAZEP UR CFM-MCNC: <25 NG/ML
CLONAZEPAM UR CFM-MCNC: <25 NG/ML
CODEINE UR CFM-MCNC: <50 NG/ML
DIAZEPAM UR CFM-MCNC: <25 NG/ML
EDDP UR CFM-MCNC: <25 NG/ML
FENTANYL UR CFM-MCNC: <2.5 NG/ML
HYDROCODONE CTO UR CFM-MCNC: <25 NG/ML
HYDROMORPHONE UR CFM-MCNC: <25 NG/ML
LORAZEPAM UR CFM-MCNC: <25 NG/ML
METHADONE UR CFM-MCNC: <25 NG/ML
MIDAZOLAM UR CFM-MCNC: <25 NG/ML
MORPHINE UR CFM-MCNC: <50 NG/ML
NORDIAZEPAM UR CFM-MCNC: <25 NG/ML
NORFENTANYL UR CFM-MCNC: <2.5 NG/ML
NORHYDROCODONE UR CFM-MCNC: <25 NG/ML
NOROXYCODONE UR CFM-MCNC: <25 NG/ML
NORTRAMADOL UR-MCNC: <50 NG/ML
OXAZEPAM UR CFM-MCNC: <25 NG/ML
OXYCODONE UR CFM-MCNC: <25 NG/ML
OXYMORPHONE UR CFM-MCNC: <25 NG/ML
TEMAZEPAM UR CFM-MCNC: <25 NG/ML
TRAMADOL UR CFM-MCNC: <50 NG/ML
ZOLPIDEM UR CFM-MCNC: >1000 NG/ML
ZOLPIDEM UR-MCNC: <25 NG/ML

## 2024-02-04 LAB
AMPHET UR-MCNC: 1081 NG/ML
MDA UR-MCNC: <200 NG/ML
MDEA UR-MCNC: <200 NG/ML
MDMA UR-MCNC: <200 NG/ML
METHAMPHET UR-MCNC: <200 NG/ML
PHENTERMINE UR CFM-MCNC: <200 NG/ML

## 2024-03-11 DIAGNOSIS — F98.8 ATTENTION DEFICIT DISORDER, UNSPECIFIED HYPERACTIVITY PRESENCE: ICD-10-CM

## 2024-03-11 RX ORDER — DEXTROAMPHETAMINE SACCHARATE, AMPHETAMINE ASPARTATE, DEXTROAMPHETAMINE SULFATE AND AMPHETAMINE SULFATE 2.5; 2.5; 2.5; 2.5 MG/1; MG/1; MG/1; MG/1
1 TABLET ORAL 2 TIMES DAILY
Qty: 60 TABLET | Refills: 0 | Status: SHIPPED | OUTPATIENT
Start: 2024-03-11 | End: 2024-04-15 | Stop reason: SDUPTHER

## 2024-03-11 NOTE — TELEPHONE ENCOUNTER
Rx Controlled Refill Request Telephone Encounter    Name: Hao Dunbar  :  1990    Medication Name:   amphetamine-dextroamphetamine (Adderall) 10 mg tablet   Quantity (Optional):   60  Directions (Optional):   Take 1 tablet (10 mg) by mouth 2 times a day.     LAST DRUG SCREEN:       LAST MED CONTRACT:        Specific Pharmacy location:    [facility]    Date of last appointment:    24

## 2024-03-17 DIAGNOSIS — F51.01 PRIMARY INSOMNIA: ICD-10-CM

## 2024-03-19 ENCOUNTER — TELEPHONE (OUTPATIENT)
Dept: PRIMARY CARE | Facility: CLINIC | Age: 34
End: 2024-03-19
Payer: COMMERCIAL

## 2024-03-19 DIAGNOSIS — F51.01 PRIMARY INSOMNIA: ICD-10-CM

## 2024-03-19 RX ORDER — ZOLPIDEM TARTRATE 5 MG/1
5 TABLET ORAL NIGHTLY PRN
Qty: 90 TABLET | Refills: 0 | Status: SHIPPED | OUTPATIENT
Start: 2024-03-19

## 2024-03-19 RX ORDER — ZOLPIDEM TARTRATE 5 MG/1
5 TABLET ORAL NIGHTLY PRN
Qty: 90 TABLET | Refills: 0 | Status: SHIPPED | OUTPATIENT
Start: 2024-03-19 | End: 2024-03-19 | Stop reason: SDUPTHER

## 2024-03-19 NOTE — TELEPHONE ENCOUNTER
Lmom for patient that rx was sent to preferred pharmacy and to rtc with any questions during regular business hours  [phone]

## 2024-03-19 NOTE — TELEPHONE ENCOUNTER
PATIENT CALLING, HE HAD A SCRIPT FOR HIS AMBIEN SENT TO [facility] BUT THEY DO NOT HAVE IT. HE IS WANTING TO KNOW IF A NEW SCRIPT FOR AMBIEN SENT TO [facility].    PLEASE ADVISE.

## 2024-03-19 NOTE — TELEPHONE ENCOUNTER
----- Message from Hao Dunbar sent at 3/18/2024  7:33 PM EDT -----  Regarding: My ambien rx   Contact: [phone]  Have a problem. I’m leaving for vacation on 4/19 and requested my ambien refill be done today so that I can get my next refill BEFORE I leave on vacation. I checked with [facility] and it was not called in.   Dr Shea would you be able to approve a 3 month supply or approve my next refill gets called in early ?   Thank you

## 2024-04-15 DIAGNOSIS — F98.8 ATTENTION DEFICIT DISORDER, UNSPECIFIED HYPERACTIVITY PRESENCE: ICD-10-CM

## 2024-04-15 RX ORDER — DEXTROAMPHETAMINE SACCHARATE, AMPHETAMINE ASPARTATE, DEXTROAMPHETAMINE SULFATE AND AMPHETAMINE SULFATE 2.5; 2.5; 2.5; 2.5 MG/1; MG/1; MG/1; MG/1
1 TABLET ORAL 2 TIMES DAILY
Qty: 60 TABLET | Refills: 0 | Status: SHIPPED | OUTPATIENT
Start: 2024-04-15 | End: 2024-05-30 | Stop reason: SDUPTHER

## 2024-04-15 NOTE — TELEPHONE ENCOUNTER
Rx Controlled Refill Request Telephone Encounter    Name: Hao Dunbar  :  1990    Medication Name:   ADDERALL  Dose (Optional):   10 MG  Quantity (Optional):   60  Directions (Optional):   TAKE BID    ALLERGIES:    SEE LIST    LAST DRUG SCREEN:     2024  LAST MED CONTRACT:    2024    Specific Pharmacy location:    [facility]    Date of last appointment:    2024  Date of next appointment:    NONE    Best number to reach patient:    [phone]

## 2024-05-30 DIAGNOSIS — F98.8 ATTENTION DEFICIT DISORDER, UNSPECIFIED HYPERACTIVITY PRESENCE: ICD-10-CM

## 2024-05-30 RX ORDER — DEXTROAMPHETAMINE SACCHARATE, AMPHETAMINE ASPARTATE, DEXTROAMPHETAMINE SULFATE AND AMPHETAMINE SULFATE 2.5; 2.5; 2.5; 2.5 MG/1; MG/1; MG/1; MG/1
1 TABLET ORAL 2 TIMES DAILY
Qty: 60 TABLET | Refills: 0 | Status: SHIPPED | OUTPATIENT
Start: 2024-05-30 | End: 2024-06-29

## 2024-05-30 NOTE — TELEPHONE ENCOUNTER
Patient is calling to request a rx refill   Aware you are out of the office until Monday and that this will be addressed by another provider    Rx Controlled Refill Request Telephone Encounter    Name: Hao Dunbar  :  1990    Medication Name:   Adderall   Dose (Optional):   10 mg  Quantity (Optional):   #60  Directions (Optional):   take 1 tablet (10 mg) by mouth 2 times a day    ALLERGIES:    see list     LAST DRUG SCREEN:     24  LAST MED CONTRACT:    24    Specific Pharmacy location:    [facility]     Date of last appointment:    24  Date of next appointment:    none     Best number to reach patient:    [phone]

## 2024-06-17 DIAGNOSIS — F51.01 PRIMARY INSOMNIA: ICD-10-CM

## 2024-06-17 RX ORDER — ZOLPIDEM TARTRATE 5 MG/1
5 TABLET ORAL NIGHTLY PRN
Qty: 30 TABLET | Refills: 0 | Status: SHIPPED | OUTPATIENT
Start: 2024-06-17

## 2024-06-17 NOTE — TELEPHONE ENCOUNTER
Patient is calling to request a rx refill. Aware this will be left with another provider since Dr Shea is out of the office this week    Rx Controlled Refill Request Telephone Encounter    Name: Hao Dunbar  :  1990    Medication Name:   Zolpidem  Dose (Optional):   5 mg  Quantity (Optional):   #90  Directions (Optional):   Take 1 tablet (5 mg) by mouth prn at bedtime for sleep    ALLERGIES:    see list     LAST DRUG SCREEN:     24  LAST MED CONTRACT:    24    Specific Pharmacy location:    [facility]     Date of last appointment:    24  Date of next appointment:    none     Best number to reach patient:    [phone]

## 2024-07-08 DIAGNOSIS — F98.8 ATTENTION DEFICIT DISORDER, UNSPECIFIED HYPERACTIVITY PRESENCE: ICD-10-CM

## 2024-07-08 RX ORDER — DEXTROAMPHETAMINE SACCHARATE, AMPHETAMINE ASPARTATE, DEXTROAMPHETAMINE SULFATE AND AMPHETAMINE SULFATE 2.5; 2.5; 2.5; 2.5 MG/1; MG/1; MG/1; MG/1
1 TABLET ORAL 2 TIMES DAILY
Qty: 30 TABLET | Refills: 0 | Status: SHIPPED | OUTPATIENT
Start: 2024-07-08 | End: 2024-08-07

## 2024-07-08 NOTE — TELEPHONE ENCOUNTER
Rx Refill Request Telephone Encounter    Name:  Hao Dunbar  :  [id number]  Medication Name:  amphetamine-dextroamphetamine (Adderall) 10 mg tablet       Specific Pharmacy location:  [facility]   Date of last appointment:  2024  Date of next appointment:  2024  Best number to reach patient:  [phone]

## 2024-07-17 DIAGNOSIS — F51.01 PRIMARY INSOMNIA: ICD-10-CM

## 2024-07-17 RX ORDER — ZOLPIDEM TARTRATE 5 MG/1
5 TABLET ORAL NIGHTLY PRN
Qty: 30 TABLET | Refills: 0 | Status: SHIPPED | OUTPATIENT
Start: 2024-07-17

## 2024-07-17 NOTE — TELEPHONE ENCOUNTER
Rx Controlled Refill Request Telephone Encounter    Name: Hao Dunbar  :  1990    Medication Name:   zolpidem (Ambien) 5 mg   Quantity (Optional):   30  Directions (Optional):   Take 1 tablet (5 mg) by mouth as needed at bedtime for sleep.     LAST DRUG SCREEN:     24  LAST MED CONTRACT:    24    Specific Pharmacy location:    [facility] [address] [place] [address]     Date of last appointment:    24  Date of next appointment:    24

## 2024-07-19 NOTE — PROGRESS NOTES
"Subjective   Patient ID: Hao Dunbar is a 34 y.o. male who presents for Annual Exam, ADD, and Insomnia.  HPI  Patient presents today for a physical. Does not need form filled out. is fasting for blood work. Tries to eat a generally healthy diet. Exercises, sometimes.    Patient presents today for a follow-up on ADD.  Currently taking Adderall  No negative side effects reported from this medication  Rates ADD control a 10/10.  Last took it this morning  Controlled substance contract signed 1-30-24.    Patient in office being seen for a follow up on Insomnia. Currently taking Ambien. Last took last night. Medication agreement signed on 1-30-24. Reports that the medication is working 10/10. 100% managed with the medication. States there are no adverse effects.        Taking current medications which were reviewed.  Problem list discussed.    Overall doing well.  Eating okay.  Staying active.    Has no other new problem /question.      ROS  Constitutional- No activity change. No appetite change.  Eyes- Denies vision changes.  Respiratory- No shortness of breath.  Cardiovascular- No palpitations. No chest pain.  GI- No nausea or vomiting. No diarrhea or constipation. Denies abdominal pain.  Musculoskeletal- Denies joint swelling.  Extremities- No edema.  Neurological- Denies headaches. Denies dizziness.  Skin- No rashes.  Psychiatric/Behavioral- Denies significant anxiety, or depressed mood.     Objective     /70   Pulse 60   Resp 18   Ht 1.753 m (5' 9\")   Wt 72.9 kg (160 lb 12.8 oz)   SpO2 98%   BMI 23.75 kg/m²     Allergies   Allergen Reactions    Amoxicillin Rash    Clindamycin Rash    Penicillins Rash       Constitutional-- Well-nourished.  No distress  Eyes- PERRL.  Conjunctiva normal.  Nose- Normal.  No rhinorrhea noted.  Throat- Oropharynx is clear and moist.  Neck- Supple with no thyromegaly.  No significant cervical adenopathy noted.  Pulmonary/Chest- Breath sounds normal with normal effort.  No " wheezing.  Heart- Regular rate and rhythm.  No murmur.  Abdomen- Soft and non-tender.  No masses noted.  Musculoskeletal- Normal ROM.  No significant joint swelling  Extremities- No edema.   Neurological- Alert.  No noted deficits.  Skin- Warm.  No rashes.  Psychiatric/Behavioral- Mood and affect normal.  Behavior normal.     Assessment/Plan   1. Routine general medical examination at a health care facility  Lipid panel      2. Attention deficit disorder, unspecified hyperactivity presence  CBC and Auto Differential    Comprehensive metabolic panel      3. Primary insomnia  CBC and Auto Differential    Comprehensive metabolic panel      4. Prolonged QT syndrome  CBC and Auto Differential    Comprehensive metabolic panel             Long talk. Treatment options reviewed.    Reviewed most recent lab work with patient. Advised patient to remain up to date on routine maintenance and health screening.     I have reviewed and validated OARRS. I advised the patient about medication abuse, addiction, and dependency. I have counseled the patient on ADHD management.     Educated on insomnia and sleep hygiene.    Complete blood work after fasting for 10 hours.  Advised patient to remain properly hydrated.     Continue and take your medications as prescribed.    Health Maintenance issues discussed.    Importance of healthy diet and regular exercise regimen discussed.    We will contact you with any test results ordered. If you do not hear from us, please contact.    Follow-up as instructed or sooner if any problems or symptoms do not resolve as expected.      Scribe Attestation  By signing my name below, ILatonia Scribe   attest that this documentation has been prepared under the direction and in the presence of Lorezno Shea MD.

## 2024-07-22 ENCOUNTER — APPOINTMENT (OUTPATIENT)
Dept: PRIMARY CARE | Facility: CLINIC | Age: 34
End: 2024-07-22
Payer: COMMERCIAL

## 2024-07-22 VITALS
HEIGHT: 69 IN | OXYGEN SATURATION: 98 % | HEART RATE: 60 BPM | SYSTOLIC BLOOD PRESSURE: 120 MMHG | WEIGHT: 160.8 LBS | BODY MASS INDEX: 23.82 KG/M2 | RESPIRATION RATE: 18 BRPM | DIASTOLIC BLOOD PRESSURE: 70 MMHG

## 2024-07-22 DIAGNOSIS — F98.8 ATTENTION DEFICIT DISORDER, UNSPECIFIED HYPERACTIVITY PRESENCE: ICD-10-CM

## 2024-07-22 DIAGNOSIS — I45.81 PROLONGED QT SYNDROME: ICD-10-CM

## 2024-07-22 DIAGNOSIS — F51.01 PRIMARY INSOMNIA: ICD-10-CM

## 2024-07-22 DIAGNOSIS — Z00.00 ROUTINE GENERAL MEDICAL EXAMINATION AT A HEALTH CARE FACILITY: Primary | ICD-10-CM

## 2024-07-22 PROCEDURE — 1036F TOBACCO NON-USER: CPT | Performed by: FAMILY MEDICINE

## 2024-07-22 PROCEDURE — 99395 PREV VISIT EST AGE 18-39: CPT | Performed by: FAMILY MEDICINE

## 2024-07-22 PROCEDURE — 3008F BODY MASS INDEX DOCD: CPT | Performed by: FAMILY MEDICINE

## 2024-07-22 ASSESSMENT — PATIENT HEALTH QUESTIONNAIRE - PHQ9
SUM OF ALL RESPONSES TO PHQ9 QUESTIONS 1 AND 2: 0
1. LITTLE INTEREST OR PLEASURE IN DOING THINGS: NOT AT ALL
2. FEELING DOWN, DEPRESSED OR HOPELESS: NOT AT ALL

## 2024-08-05 DIAGNOSIS — F98.8 ATTENTION DEFICIT DISORDER, UNSPECIFIED HYPERACTIVITY PRESENCE: ICD-10-CM

## 2024-08-05 RX ORDER — DEXTROAMPHETAMINE SACCHARATE, AMPHETAMINE ASPARTATE, DEXTROAMPHETAMINE SULFATE AND AMPHETAMINE SULFATE 2.5; 2.5; 2.5; 2.5 MG/1; MG/1; MG/1; MG/1
1 TABLET ORAL 2 TIMES DAILY
Qty: 60 TABLET | Refills: 0 | Status: SHIPPED | OUTPATIENT
Start: 2024-08-05 | End: 2024-09-04

## 2024-08-05 NOTE — TELEPHONE ENCOUNTER
MEDICATION PENDED    Rx Controlled Refill Request Telephone Encounter    Name: Hao Dunbar  :  1990    Medication Name:   adderall  Dose (Optional): 10 mg  Quantity (Optional):   60  Directions (Optional):    Take 1 tablet (10 mg) by mouth 2 times a day.     ALLERGIES:    amoxicillin, clindamycvin, pcn    LAST DRUG SCREEN:     24  LAST MED CONTRACT:    24    Specific Pharmacy location:    [facility] #[id number] [address]   [address] [facility] [id number]     Date of last appointment:    24         Best number to reach patient:    [phone]

## 2024-08-16 DIAGNOSIS — F51.01 PRIMARY INSOMNIA: ICD-10-CM

## 2024-08-16 RX ORDER — ZOLPIDEM TARTRATE 5 MG/1
5 TABLET ORAL NIGHTLY PRN
Qty: 30 TABLET | Refills: 0 | Status: SHIPPED | OUTPATIENT
Start: 2024-08-16

## 2024-08-16 NOTE — TELEPHONE ENCOUNTER
MEDICATION PENDED  Rx Controlled Refill Request Telephone Encounter    Name: Hao Dunbar  :  1990    Medication Name:   zolpidem (Ambien) 5 mg tablet   Quantity (Optional):   30  Directions (Optional):   Take 1 tablet (5 mg) by mouth as needed at bedtime for sleep.     LAST DRUG SCREEN:     24  LAST MED CONTRACT:    24    Specific Pharmacy location:    [facility] #[id number] [facility] [address] [place] [address]     Date of last appointment:    24

## 2024-09-09 DIAGNOSIS — F51.01 PRIMARY INSOMNIA: ICD-10-CM

## 2024-09-09 DIAGNOSIS — F98.8 ATTENTION DEFICIT DISORDER, UNSPECIFIED HYPERACTIVITY PRESENCE: ICD-10-CM

## 2024-09-09 RX ORDER — DEXTROAMPHETAMINE SACCHARATE, AMPHETAMINE ASPARTATE, DEXTROAMPHETAMINE SULFATE AND AMPHETAMINE SULFATE 2.5; 2.5; 2.5; 2.5 MG/1; MG/1; MG/1; MG/1
1 TABLET ORAL 2 TIMES DAILY
Qty: 60 TABLET | Refills: 0 | Status: SHIPPED | OUTPATIENT
Start: 2024-09-09 | End: 2024-10-09

## 2024-09-09 RX ORDER — ZOLPIDEM TARTRATE 5 MG/1
5 TABLET ORAL NIGHTLY PRN
Qty: 30 TABLET | Refills: 0 | Status: SHIPPED | OUTPATIENT
Start: 2024-09-09

## 2024-09-09 NOTE — TELEPHONE ENCOUNTER
Last seen 7/22/24  Medication pended  UDS CONTRACT 1/30/24    Hao MONGE Do [id number] [facility] Clinical Support Staff (supporting Lorenzo Shea MD)5 hours ago (9:37 AM)       Doctor, I’ll be leaving for a week long backpacking trip to [place] this Elvis 9/15/24. I’ll need to have both of the above prescriptions phoned into the [facility] in [place] ready for pick-up by Saturday 9/14. The adderall should be fine, the issue arises with the ambien as I won’t run out of my current supply until next Wednesday while I’m in the trip. So having it phoned in and seeing if they will refill it 3 days early will be important. I won’t have access to any pharmacies while in the [place].     Thanks,     Hao

## 2024-09-23 ENCOUNTER — LAB (OUTPATIENT)
Dept: LAB | Facility: LAB | Age: 34
End: 2024-09-23
Payer: COMMERCIAL

## 2024-09-23 DIAGNOSIS — F98.8 ATTENTION DEFICIT DISORDER, UNSPECIFIED HYPERACTIVITY PRESENCE: ICD-10-CM

## 2024-09-23 DIAGNOSIS — Z00.00 ROUTINE GENERAL MEDICAL EXAMINATION AT A HEALTH CARE FACILITY: ICD-10-CM

## 2024-09-23 DIAGNOSIS — F51.01 PRIMARY INSOMNIA: ICD-10-CM

## 2024-09-23 DIAGNOSIS — I45.81 PROLONGED QT SYNDROME: ICD-10-CM

## 2024-09-23 LAB
ALBUMIN SERPL BCP-MCNC: 4.7 G/DL (ref 3.4–5)
ALP SERPL-CCNC: 68 U/L (ref 33–120)
ALT SERPL W P-5'-P-CCNC: 34 U/L (ref 10–52)
ANION GAP SERPL CALC-SCNC: 10 MMOL/L (ref 10–20)
AST SERPL W P-5'-P-CCNC: 27 U/L (ref 9–39)
BASOPHILS # BLD AUTO: 0.05 X10*3/UL (ref 0–0.1)
BASOPHILS NFR BLD AUTO: 0.8 %
BILIRUB SERPL-MCNC: 0.6 MG/DL (ref 0–1.2)
BUN SERPL-MCNC: 11 MG/DL (ref 6–23)
CALCIUM SERPL-MCNC: 9.8 MG/DL (ref 8.6–10.3)
CHLORIDE SERPL-SCNC: 103 MMOL/L (ref 98–107)
CHOLEST SERPL-MCNC: 166 MG/DL (ref 0–199)
CHOLESTEROL/HDL RATIO: 3.2
CO2 SERPL-SCNC: 30 MMOL/L (ref 21–32)
CREAT SERPL-MCNC: 1.02 MG/DL (ref 0.5–1.3)
EGFRCR SERPLBLD CKD-EPI 2021: >90 ML/MIN/1.73M*2
EOSINOPHIL # BLD AUTO: 0.24 X10*3/UL (ref 0–0.7)
EOSINOPHIL NFR BLD AUTO: 4 %
ERYTHROCYTE [DISTWIDTH] IN BLOOD BY AUTOMATED COUNT: 12.5 % (ref 11.5–14.5)
GLUCOSE SERPL-MCNC: 102 MG/DL (ref 74–99)
HCT VFR BLD AUTO: 45.2 % (ref 41–52)
HDLC SERPL-MCNC: 51.2 MG/DL
HGB BLD-MCNC: 15.6 G/DL (ref 13.5–17.5)
IMM GRANULOCYTES # BLD AUTO: 0.01 X10*3/UL (ref 0–0.7)
IMM GRANULOCYTES NFR BLD AUTO: 0.2 % (ref 0–0.9)
LDLC SERPL CALC-MCNC: 96 MG/DL
LYMPHOCYTES # BLD AUTO: 1.59 X10*3/UL (ref 1.2–4.8)
LYMPHOCYTES NFR BLD AUTO: 26.6 %
MCH RBC QN AUTO: 31 PG (ref 26–34)
MCHC RBC AUTO-ENTMCNC: 34.5 G/DL (ref 32–36)
MCV RBC AUTO: 90 FL (ref 80–100)
MONOCYTES # BLD AUTO: 0.41 X10*3/UL (ref 0.1–1)
MONOCYTES NFR BLD AUTO: 6.9 %
NEUTROPHILS # BLD AUTO: 3.68 X10*3/UL (ref 1.2–7.7)
NEUTROPHILS NFR BLD AUTO: 61.5 %
NON HDL CHOLESTEROL: 115 MG/DL (ref 0–149)
NRBC BLD-RTO: 0 /100 WBCS (ref 0–0)
PLATELET # BLD AUTO: 268 X10*3/UL (ref 150–450)
POTASSIUM SERPL-SCNC: 4.3 MMOL/L (ref 3.5–5.3)
PROT SERPL-MCNC: 7.4 G/DL (ref 6.4–8.2)
RBC # BLD AUTO: 5.03 X10*6/UL (ref 4.5–5.9)
SODIUM SERPL-SCNC: 139 MMOL/L (ref 136–145)
TRIGL SERPL-MCNC: 94 MG/DL (ref 0–149)
VLDL: 19 MG/DL (ref 0–40)
WBC # BLD AUTO: 6 X10*3/UL (ref 4.4–11.3)

## 2024-09-23 PROCEDURE — 36415 COLL VENOUS BLD VENIPUNCTURE: CPT

## 2024-09-23 PROCEDURE — 80061 LIPID PANEL: CPT

## 2024-09-23 PROCEDURE — 80053 COMPREHEN METABOLIC PANEL: CPT

## 2024-09-23 PROCEDURE — 85025 COMPLETE CBC W/AUTO DIFF WBC: CPT

## 2024-09-24 ENCOUNTER — TELEPHONE (OUTPATIENT)
Dept: PRIMARY CARE | Facility: CLINIC | Age: 34
End: 2024-09-24
Payer: COMMERCIAL

## 2024-09-24 NOTE — TELEPHONE ENCOUNTER
----- Message from Lorenzo Shea sent at 9/24/2024  7:38 AM EDT -----  Labs are within normal limits or stable.  Continue healthy diet and follow-up per routine.  Please let them know

## 2024-10-17 DIAGNOSIS — F98.8 ATTENTION DEFICIT DISORDER, UNSPECIFIED TYPE: ICD-10-CM

## 2024-10-17 DIAGNOSIS — F51.01 PRIMARY INSOMNIA: ICD-10-CM

## 2024-10-17 RX ORDER — ZOLPIDEM TARTRATE 5 MG/1
5 TABLET ORAL NIGHTLY PRN
Qty: 30 TABLET | Refills: 0 | Status: SHIPPED | OUTPATIENT
Start: 2024-10-17

## 2024-10-17 RX ORDER — DEXTROAMPHETAMINE SACCHARATE, AMPHETAMINE ASPARTATE, DEXTROAMPHETAMINE SULFATE AND AMPHETAMINE SULFATE 2.5; 2.5; 2.5; 2.5 MG/1; MG/1; MG/1; MG/1
1 TABLET ORAL 2 TIMES DAILY
Qty: 60 TABLET | Refills: 0 | Status: SHIPPED | OUTPATIENT
Start: 2024-10-17 | End: 2024-11-16

## 2024-10-17 NOTE — TELEPHONE ENCOUNTER
Last seen 7/22/24  Medication pended  UDS CONTRACT 1/30/24    Hao MONGE Do [id number] [facility] Clinical Support Staff (supporting Lorenzo Shea MD)13 minutes ago (7:44 AM)       Hey Doc,      I need both prescriptions, zolpidem and amphetamine to be refilled at [facility] later today please. If I need to be reached my cell is [phone].     Thank you     Hao

## 2024-11-18 DIAGNOSIS — F51.01 PRIMARY INSOMNIA: ICD-10-CM

## 2024-11-18 RX ORDER — ZOLPIDEM TARTRATE 5 MG/1
5 TABLET ORAL NIGHTLY PRN
Qty: 30 TABLET | Refills: 0 | Status: SHIPPED | OUTPATIENT
Start: 2024-11-18

## 2024-11-18 NOTE — TELEPHONE ENCOUNTER
MEDICATION PENDED  Rx Controlled Refill Request Telephone Encounter    Name: Hao Dunbar  :  1990    zolpidem (Ambien) 5 mg tablet [[id number]]    Order Details  Dose: 5 mg Route: oral Frequency: Nightly PRN for sleep   Dispense Quantity: 30 tablet Refills: 0          Sig: Take 1 tablet (5 mg) by mouth as needed at bedtime for sleep.       ALLERGIES:    SEE LIST    LAST DRUG SCREEN/MED CONTRACT:  2024    Specific Pharmacy location:   [facility]    Date of last appointment:    2024  Date of next appointment:    NONE    Best number to reach patient:    [phone]

## 2024-12-16 DIAGNOSIS — F98.8 ATTENTION DEFICIT DISORDER, UNSPECIFIED TYPE: ICD-10-CM

## 2024-12-16 DIAGNOSIS — F51.01 PRIMARY INSOMNIA: ICD-10-CM

## 2024-12-16 RX ORDER — DEXTROAMPHETAMINE SACCHARATE, AMPHETAMINE ASPARTATE, DEXTROAMPHETAMINE SULFATE AND AMPHETAMINE SULFATE 2.5; 2.5; 2.5; 2.5 MG/1; MG/1; MG/1; MG/1
1 TABLET ORAL 2 TIMES DAILY
Qty: 60 TABLET | Refills: 0 | Status: SHIPPED | OUTPATIENT
Start: 2024-12-16 | End: 2025-01-15

## 2024-12-16 RX ORDER — ZOLPIDEM TARTRATE 5 MG/1
5 TABLET ORAL NIGHTLY PRN
Qty: 30 TABLET | Refills: 0 | Status: SHIPPED | OUTPATIENT
Start: 2024-12-16

## 2024-12-16 NOTE — TELEPHONE ENCOUNTER
MEDICATION PENDED  Rx Controlled Refill Request Telephone Encounter    Name: Hao Dunbar  :  1990    Medication Name:   ZOLPIDEM  Dose (Optional):   5 MG  Quantity (Optional):   30  Directions (Optional):   1 TABLET AT BEDTIME    Medication Name:   ADDERALL  Dose (Optional):   10 MG  Quantity (Optional):   60  Directions (Optional):   1 TABLET TWICE A DAY    ALLERGIES:    ON FILE    LAST DRUG SCREEN/MED CONTRACT:     24    Specific Pharmacy location:    [facility]    Date of last appointment:    24  Date of next appointment:    1/15/2025    Best number to reach patient:    [phone]      Otezla Pregnancy And Lactation Text: This medication is Pregnancy Category C and it isn't known if it is safe during pregnancy. It is unknown if it is excreted in breast milk.

## 2025-01-15 ENCOUNTER — APPOINTMENT (OUTPATIENT)
Dept: PRIMARY CARE | Facility: CLINIC | Age: 35
End: 2025-01-15
Payer: COMMERCIAL

## 2025-01-15 VITALS
HEIGHT: 69 IN | DIASTOLIC BLOOD PRESSURE: 80 MMHG | BODY MASS INDEX: 24.73 KG/M2 | SYSTOLIC BLOOD PRESSURE: 120 MMHG | RESPIRATION RATE: 18 BRPM | WEIGHT: 167 LBS

## 2025-01-15 DIAGNOSIS — F51.01 PRIMARY INSOMNIA: ICD-10-CM

## 2025-01-15 DIAGNOSIS — Z30.09 VASECTOMY EVALUATION: ICD-10-CM

## 2025-01-15 DIAGNOSIS — Z51.81 THERAPEUTIC DRUG MONITORING: ICD-10-CM

## 2025-01-15 DIAGNOSIS — F98.8 ATTENTION DEFICIT DISORDER, UNSPECIFIED TYPE: Primary | ICD-10-CM

## 2025-01-15 PROCEDURE — 99213 OFFICE O/P EST LOW 20 MIN: CPT | Performed by: FAMILY MEDICINE

## 2025-01-15 PROCEDURE — 80359 METHYLENEDIOXYAMPHETAMINES: CPT

## 2025-01-15 PROCEDURE — 3008F BODY MASS INDEX DOCD: CPT | Performed by: FAMILY MEDICINE

## 2025-01-15 RX ORDER — ZOLPIDEM TARTRATE 5 MG/1
5 TABLET ORAL NIGHTLY PRN
Qty: 30 TABLET | Refills: 5 | Status: SHIPPED | OUTPATIENT
Start: 2025-01-15

## 2025-01-15 RX ORDER — DEXTROAMPHETAMINE SACCHARATE, AMPHETAMINE ASPARTATE, DEXTROAMPHETAMINE SULFATE AND AMPHETAMINE SULFATE 2.5; 2.5; 2.5; 2.5 MG/1; MG/1; MG/1; MG/1
1 TABLET ORAL 2 TIMES DAILY
Qty: 60 TABLET | Refills: 0 | Status: SHIPPED | OUTPATIENT
Start: 2025-01-15 | End: 2025-02-14

## 2025-01-15 NOTE — PROGRESS NOTES
"Subjective   Patient ID: Hao Dunbar is a 34 y.o. male who presents for ADD and Insomnia.  HPI    Patient presents today for a follow-up on ADD.  Currently taking adderall   No negative side effects reported from this medication  Rates ADD control a 10/10.  Last took it this morning  Controlled substance contract signed 1/15/25    Patient in office being seen for a follow up on Insomnia. Currently taking ambien. Last took last night. Medication agreement signed on 1/15/25. Reports that the medication is working 10/10. 100% managed with the medication. States there are no adverse effects.        Taking current medications which were reviewed.  Problem list discussed.    Overall doing well.  Eating okay.  Staying active.    Has no other new problem /question.     ROS  Constitutional- No activity change. No appetite change.  Respiratory- No shortness of breath.  Cardiovascular- No palpitations. No chest pain.  GI- No nausea or vomiting. No diarrhea or constipation. Denies abdominal pain.  Musculoskeletal- Denies joint swelling.  Neurological- Denies headaches. Denies dizziness.  Psychiatric/Behavioral- Denies significant anxiety, or depressed mood.     Objective     /80   Resp 18   Ht 1.753 m (5' 9\")   Wt 75.8 kg (167 lb)   BMI 24.66 kg/m²     Allergies   Allergen Reactions    Amoxicillin Rash    Clindamycin Rash    Penicillins Rash       Constitutional-- Well-nourished.  No distress  Eyes- PERRL.  Conjunctiva normal.  Nose- Normal.  No rhinorrhea noted.  Throat- Oropharynx is clear and moist.  Neck- Supple with no thyromegaly.  No significant cervical adenopathy noted.  Pulmonary/Chest- Breath sounds normal with normal effort.  No wheezing.  Heart- Regular rate and rhythm.  No murmur.  Abdomen- Soft and non-tender.  No masses noted.  Musculoskeletal- Normal ROM.  No significant joint swelling  Extremities- No edema.   Neurological- Alert.  No noted deficits.  Skin- Warm.    Psychiatric/Behavioral- Mood " and affect normal.  Behavior normal.     Assessment/Plan   1. Attention deficit disorder, unspecified type  amphetamine-dextroamphetamine (Adderall) 10 mg tablet      2. Primary insomnia  zolpidem (Ambien) 5 mg tablet      3. Therapeutic drug monitoring  Opiate/Opioid/Benzo Prescription Compliance    Amphetamine Confirm, Urine    Amphetamine Confirm, Urine    OOB Internal Tracking    Amphetamine Confirm, Urine      4. Vasectomy evaluation  Referral to Urology             Long talk. Treatment options reviewed.    Reviewed most recent lab work with patient. Advised patient to remain up to date on routine maintenance and health screening.  ]    Discussed Vasectomy related questions and concerns. Referral placed with Urologist.      I have reviewed and validated OARRS. I advised the patient about medication abuse, addiction, and dependency. I have counseled the patient on ADHD management.    Educated on insomnia and sleep hygiene.    Discussed the importance of routine stretching and exercise.     Continue and take your medications as prescribed.    Health Maintenance issues discussed.    Importance of healthy diet and regular exercise regimen discussed.    We will contact you with any test results ordered. If you do not hear from us, please contact.    Follow-up as instructed or sooner if any problems or symptoms do not resolve as expected.        ,Scribe Attestation  By signing my name below, ILatonia Scribe   attest that this documentation has been prepared under the direction and in the presence of Lorenzo Shea MD.

## 2025-01-16 LAB
AMPHETAMINES UR QL SCN: ABNORMAL
BARBITURATES UR QL SCN: ABNORMAL
BZE UR QL SCN: ABNORMAL
CANNABINOIDS UR QL SCN: ABNORMAL
CREAT UR-MCNC: 119.4 MG/DL (ref 20–370)
PCP UR QL SCN: ABNORMAL

## 2025-01-19 LAB
AMPHET UR-MCNC: 2891 NG/ML
MDA UR-MCNC: <200 NG/ML
MDEA UR-MCNC: <200 NG/ML
MDMA UR-MCNC: <200 NG/ML
METHAMPHET UR-MCNC: <200 NG/ML
PHENTERMINE UR CFM-MCNC: <200 NG/ML

## 2025-01-20 LAB
AMPHET UR-MCNC: 2880 NG/ML
MDA UR-MCNC: <200 NG/ML
MDEA UR-MCNC: <200 NG/ML
MDMA UR-MCNC: <200 NG/ML
METHAMPHET UR-MCNC: <200 NG/ML
PHENTERMINE UR CFM-MCNC: <200 NG/ML

## 2025-03-04 ENCOUNTER — TELEPHONE (OUTPATIENT)
Dept: UROLOGY | Facility: CLINIC | Age: 35
End: 2025-03-04
Payer: COMMERCIAL

## 2025-03-06 ENCOUNTER — APPOINTMENT (OUTPATIENT)
Dept: UROLOGY | Facility: CLINIC | Age: 35
End: 2025-03-06
Payer: COMMERCIAL

## 2025-03-13 DIAGNOSIS — F98.8 ATTENTION DEFICIT DISORDER, UNSPECIFIED TYPE: ICD-10-CM

## 2025-03-13 NOTE — TELEPHONE ENCOUNTER
Patient is calling to request a RX refill on his Adderall.  Aware you are out of the office today    Rx Controlled Refill Request Telephone Encounter    Name: Hao Dunbar  :  1990    Medication Name:   Adderall  Dose (Optional):   10 mg  Quantity (Optional):   60  Directions (Optional):   Take 1 tablet (10 mg) by mouth 2 times a day    ALLERGIES:    see list     LAST DRUG SCREEN/MED CONTRACT:     1/15/25    Specific Pharmacy location:    [facility]    Date of last appointment:    1/15/25  Date of next appointment:    none     Best number to reach patient:    [phone]

## 2025-03-14 RX ORDER — DEXTROAMPHETAMINE SACCHARATE, AMPHETAMINE ASPARTATE, DEXTROAMPHETAMINE SULFATE AND AMPHETAMINE SULFATE 2.5; 2.5; 2.5; 2.5 MG/1; MG/1; MG/1; MG/1
1 TABLET ORAL 2 TIMES DAILY
Qty: 60 TABLET | Refills: 0 | Status: SHIPPED | OUTPATIENT
Start: 2025-03-14 | End: 2025-04-13

## 2025-04-14 ENCOUNTER — APPOINTMENT (OUTPATIENT)
Dept: PRIMARY CARE | Facility: CLINIC | Age: 35
End: 2025-04-14
Payer: COMMERCIAL

## 2025-04-14 ASSESSMENT — ENCOUNTER SYMPTOMS
STRIDOR: 1
SWOLLEN GLANDS: 1
COUGH: 1
SORE THROAT: 1

## 2025-04-16 DIAGNOSIS — F98.8 ATTENTION DEFICIT DISORDER, UNSPECIFIED TYPE: ICD-10-CM

## 2025-04-16 RX ORDER — DEXTROAMPHETAMINE SACCHARATE, AMPHETAMINE ASPARTATE, DEXTROAMPHETAMINE SULFATE AND AMPHETAMINE SULFATE 2.5; 2.5; 2.5; 2.5 MG/1; MG/1; MG/1; MG/1
1 TABLET ORAL 2 TIMES DAILY
Qty: 60 TABLET | Refills: 0 | Status: SHIPPED | OUTPATIENT
Start: 2025-04-16 | End: 2025-05-16

## 2025-04-16 NOTE — TELEPHONE ENCOUNTER
MEDICATION PENDED  Rx Controlled Refill Request Telephone Encounter    Name: Hao Dunbar  :  1990    Medication Name:   ADDERALL   Dose (Optional):   10 MG  Quantity (Optional):   60  Directions (Optional):   1 TABLET TWICE A DAY    ALLERGIES:    ON FILE    LAST DRUG SCREEN/MED CONTRACT:         Specific Pharmacy location:    [facility]    Date of last appointment:    1/15/25  Date of next appointment:        Best number to reach patient:    [phone]

## 2025-05-13 ENCOUNTER — APPOINTMENT (OUTPATIENT)
Age: 35
End: 2025-05-13
Payer: COMMERCIAL

## 2025-05-16 DIAGNOSIS — F98.8 ATTENTION DEFICIT DISORDER, UNSPECIFIED TYPE: ICD-10-CM

## 2025-05-16 RX ORDER — DEXTROAMPHETAMINE SACCHARATE, AMPHETAMINE ASPARTATE, DEXTROAMPHETAMINE SULFATE AND AMPHETAMINE SULFATE 2.5; 2.5; 2.5; 2.5 MG/1; MG/1; MG/1; MG/1
1 TABLET ORAL 2 TIMES DAILY
Qty: 60 TABLET | Refills: 0 | Status: SHIPPED | OUTPATIENT
Start: 2025-05-16 | End: 2025-06-15

## 2025-05-16 NOTE — TELEPHONE ENCOUNTER
Hao Dunbar to P [facility] [id number] [facility] Clinical Support Staff (supporting Lorenzo Shea MD) (Selected Message)        5/15/25  9:03 AM  Hey Doc,  -first thing, I need to have the dextroamphetamine refilled. [facility] in [place]     -second, I have a question about my Zolipdem. Within the last two weeks or so, I haven’t been able to fall asleep when taking the medication. Nothing has really changed in terms of physical activity during the day, bedtimes, screen time etc. I’ve followed the same routine I always have but it seems it isn’t allowing me to fall asleep in a timely manner.      I’m wondering what I need to adjust to get back to sleeping. I’d prefer to not have to go to 10mg if at all possible but if that’s what needs to be done it’s fine. I’m wondering if maybe increasing taking melatonin before bedtime would help? Or if it’s possible to get a dose that’s slightly more than the 5mg I’m taking (maybe 6 or 7.5?)  I read about the sublingual tablets that come in 1.75mg so I wonder if that would work?   Is there a different medication that I should try to maybe get off of the zolpidem for a short period to possibly eliminate a tolerance being built?     Just not sure what I should do, so I’m open to your thoughts there.      Hao Abreu

## 2025-05-19 ENCOUNTER — APPOINTMENT (OUTPATIENT)
Dept: UROLOGY | Facility: CLINIC | Age: 35
End: 2025-05-19
Payer: COMMERCIAL

## 2025-05-19 ENCOUNTER — APPOINTMENT (OUTPATIENT)
Age: 35
End: 2025-05-19
Payer: COMMERCIAL

## 2025-05-20 ENCOUNTER — APPOINTMENT (OUTPATIENT)
Age: 35
End: 2025-05-20
Payer: COMMERCIAL

## 2025-05-20 VITALS — SYSTOLIC BLOOD PRESSURE: 126 MMHG | HEART RATE: 74 BPM | DIASTOLIC BLOOD PRESSURE: 74 MMHG

## 2025-05-20 DIAGNOSIS — Z30.09 VASECTOMY EVALUATION: ICD-10-CM

## 2025-05-20 PROCEDURE — 99202 OFFICE O/P NEW SF 15 MIN: CPT

## 2025-05-20 NOTE — PROGRESS NOTES
Chief complaint:  vasectomy consult  Referring physician:  Lorenzo Shea MD     SUBJECTIVE:  HPI:  Hao Dunbar is a 34 y.o. male with a history of prolonged QT syndorme (Defibrillator that was removed in 2019) who presents for initial evaluation of vasectomy.  No kids. He asks for a vasectomy.    Medical history:   has a past medical history of Acute nasopharyngitis (common cold) (10/19/2021), Acute pansinusitis, unspecified (10/19/2021), Contact with and (suspected) exposure to covid-19 (10/19/2021), Encounter for general adult medical examination without abnormal findings, Encounter for immunization, Nasal congestion (10/19/2021), and Other conditions influencing health status.   Surgical history:   has a past surgical history that includes Other surgical history (04/15/2019).  Family history:  family history is not on file.  Social history:   reports that he has never smoked. He has never used smokeless tobacco.    Medications:    Current Outpatient Medications   Medication Instructions    amphetamine-dextroamphetamine (Adderall) 10 mg tablet 10 mg, oral, 2 times daily    MELATONIN ORAL See admin instructions    triamcinolone (Kenalog) 0.1 % cream See admin instructions    zolpidem (AMBIEN) 5 mg, oral, Nightly PRN      Allergies:    RX Allergies[1]     ROS:  14-point review of systems negative except as noted above.    OBJECTIVE:  Visit Vitals  /74   Pulse 74   There is no height or weight on file to calculate BMI.    Physical exam  General:  No acute distress  HEENT:  EOMI  CV:  Regular rate  Pulm:  Nonlabored respirations  Abd:  Soft, non-distended  :  No suprapubic or CVA tenderness  MSK:  No contractures  Neuro:  Motor intact  Psych:  Appropriate affect    Labs:    Lab Results   Component Value Date    WBC 6.0 09/23/2024    HGB 15.6 09/23/2024    HCT 45.2 09/23/2024     09/23/2024    ALT 34 09/23/2024    AST 27 09/23/2024     09/23/2024    K 4.3 09/23/2024     09/23/2024     "CREATININE 1.02 09/23/2024    BUN 11 09/23/2024    CO2 30 09/23/2024   No results found for: \"URINECULTURE\" No results found for: \"PSA\"    Imaging:  All imaging discussed in HPI was independently reviewed.    ASSESSMENT:  Discussed about the risks and benefits of a vasectomy. He agrees, understand and accepts.    PLAN:  Vasectomy    Follow-up after vasectomy    Anjel Jackson MD    Problem List Items Addressed This Visit    None  Visit Diagnoses         Vasectomy evaluation                   [1]   Allergies  Allergen Reactions    Telithromycin Hives    Amoxicillin Rash    Clindamycin Rash    Penicillins Rash     "

## 2025-06-13 DIAGNOSIS — F51.01 PRIMARY INSOMNIA: ICD-10-CM

## 2025-06-13 DIAGNOSIS — F98.8 ATTENTION DEFICIT DISORDER, UNSPECIFIED TYPE: ICD-10-CM

## 2025-06-13 NOTE — TELEPHONE ENCOUNTER
Patient is calling because he is due for his Ambien 5 mg on 25. He is going to be going out of town 25- 25. He states that will make him run out of his pills [facility] through is vacation. He wanted to know if he could get this next fill on 25 for a 90 day supply?  He scheduled his next appointment with you for 25 as he is due for his 6 month follow up    Rx Controlled Refill Request Telephone Encounter    Name: Hao Dunbar  :  1990    Medication Name:   Ambien  Dose (Optional):   5 mg  Quantity (Optional):     Directions (Optional):   Take 1 tablet (5 mg) by mouth as needed at bedtime for sleep    ALLERGIES:    see list     LAST DRUG SCREEN/MED CONTRACT:     1/15/25    Specific Pharmacy location:    [facility]     Date of last appointment:    1/15/25  Date of next appointment:    25    Best number to reach patient:    [phone]

## 2025-06-16 RX ORDER — ZOLPIDEM TARTRATE 5 MG/1
5 TABLET ORAL NIGHTLY PRN
Qty: 90 TABLET | Refills: 1 | Status: SHIPPED | OUTPATIENT
Start: 2025-06-16

## 2025-06-16 RX ORDER — DEXTROAMPHETAMINE SACCHARATE, AMPHETAMINE ASPARTATE, DEXTROAMPHETAMINE SULFATE AND AMPHETAMINE SULFATE 2.5; 2.5; 2.5; 2.5 MG/1; MG/1; MG/1; MG/1
1 TABLET ORAL 2 TIMES DAILY
Qty: 60 TABLET | Refills: 0 | Status: SHIPPED | OUTPATIENT
Start: 2025-06-16 | End: 2025-07-16

## 2025-07-28 ENCOUNTER — APPOINTMENT (OUTPATIENT)
Dept: PRIMARY CARE | Facility: CLINIC | Age: 35
End: 2025-07-28
Payer: COMMERCIAL

## 2025-07-28 VITALS
SYSTOLIC BLOOD PRESSURE: 120 MMHG | HEIGHT: 69 IN | BODY MASS INDEX: 24.88 KG/M2 | DIASTOLIC BLOOD PRESSURE: 80 MMHG | WEIGHT: 168 LBS | RESPIRATION RATE: 18 BRPM

## 2025-07-28 DIAGNOSIS — F98.8 ATTENTION DEFICIT DISORDER, UNSPECIFIED TYPE: ICD-10-CM

## 2025-07-28 DIAGNOSIS — F51.01 PRIMARY INSOMNIA: ICD-10-CM

## 2025-07-28 DIAGNOSIS — Z00.00 ANNUAL PHYSICAL EXAM: Primary | ICD-10-CM

## 2025-07-28 PROCEDURE — 99395 PREV VISIT EST AGE 18-39: CPT | Performed by: FAMILY MEDICINE

## 2025-07-28 PROCEDURE — 3008F BODY MASS INDEX DOCD: CPT | Performed by: FAMILY MEDICINE

## 2025-07-28 RX ORDER — DEXTROAMPHETAMINE SACCHARATE, AMPHETAMINE ASPARTATE, DEXTROAMPHETAMINE SULFATE AND AMPHETAMINE SULFATE 2.5; 2.5; 2.5; 2.5 MG/1; MG/1; MG/1; MG/1
1 TABLET ORAL 2 TIMES DAILY
Qty: 60 TABLET | Refills: 0 | Status: SHIPPED | OUTPATIENT
Start: 2025-07-28 | End: 2025-08-27

## 2025-08-29 DIAGNOSIS — F98.8 ATTENTION DEFICIT DISORDER, UNSPECIFIED TYPE: ICD-10-CM

## 2025-08-29 RX ORDER — DEXTROAMPHETAMINE SACCHARATE, AMPHETAMINE ASPARTATE, DEXTROAMPHETAMINE SULFATE AND AMPHETAMINE SULFATE 2.5; 2.5; 2.5; 2.5 MG/1; MG/1; MG/1; MG/1
1 TABLET ORAL 2 TIMES DAILY
Qty: 60 TABLET | Refills: 0 | Status: SHIPPED | OUTPATIENT
Start: 2025-08-29 | End: 2025-09-28

## 2026-01-28 ENCOUNTER — APPOINTMENT (OUTPATIENT)
Dept: PRIMARY CARE | Facility: CLINIC | Age: 36
End: 2026-01-28
Payer: COMMERCIAL

## 2026-03-18 ENCOUNTER — APPOINTMENT (OUTPATIENT)
Dept: UROLOGY | Facility: CLINIC | Age: 36
End: 2026-03-18
Payer: COMMERCIAL

## 2026-03-25 ENCOUNTER — APPOINTMENT (OUTPATIENT)
Dept: UROLOGY | Facility: CLINIC | Age: 36
End: 2026-03-25
Payer: COMMERCIAL